# Patient Record
Sex: FEMALE | Race: WHITE | NOT HISPANIC OR LATINO | URBAN - NONMETROPOLITAN AREA
[De-identification: names, ages, dates, MRNs, and addresses within clinical notes are randomized per-mention and may not be internally consistent; named-entity substitution may affect disease eponyms.]

---

## 2017-01-04 RX ORDER — TOPIRAMATE 25 MG/1
TABLET ORAL
Qty: 120 TABLET | Refills: 0 | Status: SHIPPED | OUTPATIENT
Start: 2017-01-04

## 2017-02-07 RX ORDER — TOPIRAMATE 25 MG/1
TABLET ORAL
Qty: 120 TABLET | Refills: 0 | OUTPATIENT
Start: 2017-02-07

## 2017-06-27 DIAGNOSIS — G20 PARKINSON'S DISEASE (HCC): ICD-10-CM

## 2017-06-27 DIAGNOSIS — G43.009 MIGRAINE WITHOUT AURA AND WITHOUT STATUS MIGRAINOSUS, NOT INTRACTABLE: ICD-10-CM

## 2017-06-27 RX ORDER — ROPINIROLE 1 MG/1
TABLET, FILM COATED ORAL
Qty: 90 TABLET | Refills: 0 | Status: SHIPPED | OUTPATIENT
Start: 2017-06-27 | End: 2017-07-26 | Stop reason: SDUPTHER

## 2017-07-26 DIAGNOSIS — G20 PARKINSON'S DISEASE (HCC): ICD-10-CM

## 2017-07-26 RX ORDER — ROPINIROLE 1 MG/1
TABLET, FILM COATED ORAL
Qty: 90 TABLET | Refills: 0 | Status: SHIPPED | OUTPATIENT
Start: 2017-07-26

## 2017-09-04 DIAGNOSIS — G20 PARKINSON'S DISEASE (HCC): ICD-10-CM

## 2017-09-05 RX ORDER — ROPINIROLE 1 MG/1
TABLET, FILM COATED ORAL
Qty: 90 TABLET | Refills: 0 | OUTPATIENT
Start: 2017-09-05

## 2022-09-18 ENCOUNTER — OUTSIDE FACILITY SERVICE (OUTPATIENT)
Dept: CARDIOLOGY | Facility: CLINIC | Age: 62
End: 2022-09-18

## 2022-09-18 PROCEDURE — 93325 DOPPLER ECHO COLOR FLOW MAPG: CPT | Performed by: INTERNAL MEDICINE

## 2022-09-18 PROCEDURE — 93321 DOPPLER ECHO F-UP/LMTD STD: CPT | Performed by: INTERNAL MEDICINE

## 2022-09-18 PROCEDURE — 99222 1ST HOSP IP/OBS MODERATE 55: CPT | Performed by: INTERNAL MEDICINE

## 2022-09-18 PROCEDURE — 93308 TTE F-UP OR LMTD: CPT | Performed by: INTERNAL MEDICINE

## 2022-09-19 ENCOUNTER — OUTSIDE FACILITY SERVICE (OUTPATIENT)
Dept: CARDIOLOGY | Facility: CLINIC | Age: 62
End: 2022-09-19

## 2022-09-19 PROCEDURE — 99232 SBSQ HOSP IP/OBS MODERATE 35: CPT | Performed by: INTERNAL MEDICINE

## 2022-09-19 PROCEDURE — 93458 L HRT ARTERY/VENTRICLE ANGIO: CPT | Performed by: INTERNAL MEDICINE

## 2022-09-19 PROCEDURE — OUTSIDEPOS PR OUTSIDE POS PLACEHOLDER: Performed by: INTERNAL MEDICINE

## 2022-09-20 ENCOUNTER — OUTSIDE FACILITY SERVICE (OUTPATIENT)
Dept: CARDIOLOGY | Facility: CLINIC | Age: 62
End: 2022-09-20

## 2022-09-20 PROCEDURE — 99232 SBSQ HOSP IP/OBS MODERATE 35: CPT | Performed by: INTERNAL MEDICINE

## 2022-11-07 ENCOUNTER — OFFICE VISIT (OUTPATIENT)
Dept: CARDIOLOGY | Facility: CLINIC | Age: 62
End: 2022-11-07

## 2022-11-07 VITALS
SYSTOLIC BLOOD PRESSURE: 184 MMHG | BODY MASS INDEX: 42.65 KG/M2 | WEIGHT: 288 LBS | HEART RATE: 68 BPM | DIASTOLIC BLOOD PRESSURE: 94 MMHG | HEIGHT: 69 IN

## 2022-11-07 DIAGNOSIS — I10 PRIMARY HYPERTENSION: ICD-10-CM

## 2022-11-07 DIAGNOSIS — R60.0 BILATERAL LEG EDEMA: ICD-10-CM

## 2022-11-07 DIAGNOSIS — R00.2 PALPITATIONS: ICD-10-CM

## 2022-11-07 DIAGNOSIS — G25.2 COARSE TREMORS: ICD-10-CM

## 2022-11-07 DIAGNOSIS — E55.9 VITAMIN D DEFICIENCY: ICD-10-CM

## 2022-11-07 DIAGNOSIS — I25.9 IHD (ISCHEMIC HEART DISEASE): Primary | ICD-10-CM

## 2022-11-07 DIAGNOSIS — E66.01 MORBID OBESITY WITH BMI OF 40.0-44.9, ADULT: ICD-10-CM

## 2022-11-07 DIAGNOSIS — R40.0 DAYTIME SOMNOLENCE: ICD-10-CM

## 2022-11-07 DIAGNOSIS — E78.00 HYPERCHOLESTEREMIA: ICD-10-CM

## 2022-11-07 PROCEDURE — 99214 OFFICE O/P EST MOD 30 MIN: CPT | Performed by: INTERNAL MEDICINE

## 2022-11-07 RX ORDER — TRAZODONE HYDROCHLORIDE 150 MG/1
150 TABLET ORAL NIGHTLY
COMMUNITY

## 2022-11-07 RX ORDER — LOSARTAN POTASSIUM 50 MG/1
50 TABLET ORAL DAILY
Qty: 30 TABLET | Refills: 6 | Status: SHIPPED | OUTPATIENT
Start: 2022-11-07 | End: 2022-12-14

## 2022-11-07 RX ORDER — CLOPIDOGREL BISULFATE 75 MG/1
75 TABLET ORAL DAILY
Qty: 30 TABLET | Refills: 6 | Status: SHIPPED | OUTPATIENT
Start: 2022-11-07

## 2022-11-07 RX ORDER — FLUOXETINE HYDROCHLORIDE 60 MG/1
60 TABLET, FILM COATED ORAL; ORAL DAILY
COMMUNITY

## 2022-11-07 RX ORDER — METOPROLOL SUCCINATE 25 MG/1
25 TABLET, EXTENDED RELEASE ORAL DAILY
Qty: 30 TABLET | Refills: 6 | Status: SHIPPED | OUTPATIENT
Start: 2022-11-07

## 2022-11-07 RX ORDER — HYDROCHLOROTHIAZIDE 25 MG/1
25 TABLET ORAL DAILY
Qty: 30 TABLET | Refills: 11 | Status: SHIPPED | OUTPATIENT
Start: 2022-11-07

## 2022-11-07 RX ORDER — ATORVASTATIN CALCIUM 80 MG/1
80 TABLET, FILM COATED ORAL DAILY
Qty: 30 TABLET | Refills: 6 | Status: SHIPPED | OUTPATIENT
Start: 2022-11-07

## 2022-11-07 RX ORDER — ATORVASTATIN CALCIUM 80 MG/1
80 TABLET, FILM COATED ORAL DAILY
COMMUNITY
End: 2022-11-07 | Stop reason: SDUPTHER

## 2022-11-07 RX ORDER — LOSARTAN POTASSIUM 25 MG/1
25 TABLET ORAL DAILY
COMMUNITY
End: 2022-11-07

## 2022-11-07 RX ORDER — ASPIRIN 81 MG/1
81 TABLET ORAL DAILY
COMMUNITY
End: 2022-11-07

## 2022-11-07 RX ORDER — ASPIRIN 81 MG/1
81 TABLET ORAL DAILY
Status: SHIPPED | COMMUNITY
Start: 2022-11-07 | End: 2023-01-11

## 2022-11-07 NOTE — PROGRESS NOTES
Chief Complaint   Patient presents with   • Hospital Follow Up Visit     Cath with stenting in September, reports doing really well since discharge. Pt has lost about 50 pounds over the past few months and quit smoking.    • Medication Problem     1)  Currently on Brilinta, it is costing 162.00 a month, her son is currently paying for it, asking if it could be changed to something cheaper.   2)  pharmacy did not refill the metoprolol tart 25 mg 1/2 tab twice a day, has been out of that for almost a month. BP has been running around 207/100 at times. 170/93 consistently.    • Labs     None since discharge     • Med Refill     Refills needed on cardiac meds. [facility] to [facility]       CARDIAC COMPLAINTS  fatigue and tremors        Subjective   Vandana Lopez is a 62 y.o. female came in today for her follow-up visit.  She has history of hypertension, hypercholesterolemia who used to be a heavy smoker.  She got admitted to the hospital with 2 months ago with increasing chest tightness, shortness of breath associated with nausea.  Her baseline echocardiogram was unremarkable.  She then started having worsening of chest pain.  Stress test was canceled and underwent cardiac catheterization.  Found to have significant disease involving the LAD, circumflex and OM.  Had about 5 stents placed.  She also had significant hyper cholesterolemia with the LDL of 171.  She was started on Lipitor.  She came today stating that she has no more chest pain.  Her shortness of breath is improved.  She has lost about 50 pounds in the last 2 months.  She also completely quit smoking on her own without using any nicotine substitute.  She wants to know whether we can change Brilinta to something cheaper.  She also has not been given prescription for the metoprolol.  She does complain of having some bilateral leg swelling.  She also complaining of feeling little fatigue and tired and also sleep during the daytime.              Cardiac  History  Past Surgical History:   Procedure Laterality Date   • CARDIAC CATHETERIZATION  2022    3.0x18 in OM1. 4.5x12 & 4.5x12 in LCX. 2.25x8 & 3.0x20 in LAD   • ECHO - CONVERTED  2022    @ [facility]. TLS. EF 60%. Mild MR       Current Outpatient Medications   Medication Sig Dispense Refill   • aspirin 81 MG EC tablet Take 1 tablet by mouth Daily. Increase to Bid     • atorvastatin (LIPITOR) 80 MG tablet Take 1 tablet by mouth Daily. 30 tablet 6   • FLUoxetine (PROzac) 60 MG tablet Take 1 tablet by mouth Daily.     • traZODone (DESYREL) 150 MG tablet Take 1 tablet by mouth Every Night.     • clopidogrel (PLAVIX) 75 MG tablet Take 1 tablet by mouth Daily. 30 tablet 6   • FLUoxetine (PROzac) 20 MG capsule Take 20 mg by mouth daily.     • hydroCHLOROthiazide (HYDRODIURIL) 25 MG tablet Take 1 tablet by mouth Daily. 30 tablet 11   • losartan (Cozaar) 50 MG tablet Take 1 tablet by mouth Daily. 30 tablet 6   • metoprolol succinate XL (TOPROL-XL) 25 MG 24 hr tablet Take 1 tablet by mouth Daily. 30 tablet 6   • rOPINIRole (REQUIP) 1 MG tablet TAKE 1 TABLET BY MOUTH 3 TIMES A DAY 90 tablet 0   • topiramate (TOPAMAX) 200 MG tablet TAKE 1 TABLET TWICE A DAY 60 tablet 0   • topiramate (TOPAMAX) 25 MG tablet TAKE 1 TABLET TWICE DAILY FOR 1 WK, THEN INCREASE TO 2 TABLETS TWICE DAILY THEREAFTER 120 tablet 0     No current facility-administered medications for this visit.       Allergies  :  Patient has no known allergies.       Past Medical History:   Diagnosis Date   • Depression    • History of cholecystectomy    • Hyperlipidemia    • Hypertension    • Migraine    • Myocardial infarction (HCC)    • Tremors of nervous system        Social History     Socioeconomic History   • Marital status: Single   Tobacco Use   • Smoking status: Former     Packs/day: 2.00     Years: 23.00     Pack years: 46.00     Types: Cigarettes     Quit date: 2022     Years since quittin.1   • Smokeless tobacco: Never   Vaping Use   •  "Vaping Use: Never used   Substance and Sexual Activity   • Alcohol use: Not Currently     Comment: quit drinking about 15 years ago. ETOH abuse prior to quitting.   • Drug use: Never       Family History   Problem Relation Age of Onset   • Hepatitis Mother    • Hyperlipidemia Father    • Hypertension Father    • Cancer Father    • Heart attack Brother         multiple MI's in his early 50's   • Heart disease Brother    • Heart disease Brother    • Hyperlipidemia Brother    • Hypertension Brother    • Other Maternal Grandmother          in MVA   • Other Maternal Grandfather          in MVA   • Other Paternal Grandmother          at 97 from natural causes   • Other Paternal Grandfather          in MVA at a young age       Review of Systems   Constitutional: Positive for malaise/fatigue. Negative for decreased appetite.   HENT: Negative for congestion and sore throat.    Eyes: Negative for blurred vision, double vision and visual disturbance.   Cardiovascular: Positive for leg swelling. Negative for chest pain and dyspnea on exertion.   Respiratory: Negative for shortness of breath and snoring.    Endocrine: Negative for cold intolerance and heat intolerance.   Hematologic/Lymphatic: Negative for adenopathy. Does not bruise/bleed easily.   Skin: Negative for itching, nail changes and skin cancer.   Musculoskeletal: Negative for arthritis and myalgias.   Gastrointestinal: Negative for abdominal pain, dysphagia and heartburn.   Genitourinary: Negative for bladder incontinence and frequency.   Neurological: Positive for excessive daytime sleepiness. Negative for dizziness, seizures and vertigo.   Psychiatric/Behavioral: Negative for altered mental status.   Allergic/Immunologic: Negative for environmental allergies and hives.       Diabetes- No  Thyroid- normal    Objective     BP (!) 184/94   Pulse 68   Ht 175.3 cm (69\")   Wt 131 kg (288 lb)   BMI 42.53 kg/m²     Vitals and nursing note reviewed. "   Constitutional:       Appearance: Healthy appearance. Not in distress.   Eyes:      Conjunctiva/sclera: Conjunctivae normal.      Pupils: Pupils are equal, round, and reactive to light.   HENT:      Head: Normocephalic.   Pulmonary:      Effort: Pulmonary effort is normal.      Breath sounds: Normal breath sounds.   Cardiovascular:      PMI at left midclavicular line. Normal rate. Regular rhythm.      Murmurs: There is a grade 3/6 high frequency blowing holosystolic murmur at the apex.   Edema:     Ankle: bilateral 1+ edema of the ankle.     Feet: bilateral 1+ edema of the feet.  Abdominal:      General: Bowel sounds are normal.      Palpations: Abdomen is soft.   Musculoskeletal: Normal range of motion.      Cervical back: Normal range of motion and neck supple. Skin:     General: Skin is warm and dry.   Neurological:      Mental Status: Alert, oriented to person, place, and time and oriented to person, place and time.       Procedures            @ASSESSMENT/PLAN@  Class 3 Severe Obesity (BMI >=40). Obesity-related health conditions include the following: obstructive sleep apnea, hypertension, coronary heart disease and dyslipidemias. Obesity is improving with treatment. BMI is is above average; BMI management plan is completed. We discussed low calorie, low carb based diet program, portion control and increasing exercise.     Diagnoses and all orders for this visit:    1. IHD (ischemic heart disease) (Primary)  -     clopidogrel (PLAVIX) 75 MG tablet; Take 1 tablet by mouth Daily.  Dispense: 30 tablet; Refill: 6  -     metoprolol succinate XL (TOPROL-XL) 25 MG 24 hr tablet; Take 1 tablet by mouth Daily.  Dispense: 30 tablet; Refill: 6    2. Primary hypertension  -     losartan (Cozaar) 50 MG tablet; Take 1 tablet by mouth Daily.  Dispense: 30 tablet; Refill: 6  -     metoprolol succinate XL (TOPROL-XL) 25 MG 24 hr tablet; Take 1 tablet by mouth Daily.  Dispense: 30 tablet; Refill: 6  -     Comprehensive  Metabolic Panel; Future  -     hydroCHLOROthiazide (HYDRODIURIL) 25 MG tablet; Take 1 tablet by mouth Daily.  Dispense: 30 tablet; Refill: 11    3. Hypercholesteremia  -     Lipid Panel; Future  -     atorvastatin (LIPITOR) 80 MG tablet; Take 1 tablet by mouth Daily.  Dispense: 30 tablet; Refill: 6    4. Morbid obesity with BMI of 40.0-44.9, adult (HCC)  -     Overnight Sleep Oximetry Study; Future  -     CBC & Differential; Future    5. Coarse tremors    6. Daytime somnolence  -     Overnight Sleep Oximetry Study; Future  -     Vitamin B12 & Folate; Future    7. Vitamin D deficiency  -     Calcitriol (1,25 di-OH Vitamin D); Future    8. Palpitations  -     TSH; Future    9. Bilateral leg edema  -     hydroCHLOROthiazide (HYDRODIURIL) 25 MG tablet; Take 1 tablet by mouth Daily.  Dispense: 30 tablet; Refill: 11       At baseline her heart rate is stable.  Her blood pressure is elevated.  Her clinical examination reveals a BMI of 43.  Her cardiovascular examination is otherwise unremarkable.  She does have trace to 1+ pedal edema.    Regarding her ischemic heart disease, she has undergone multiple stenting.  It is almost 2 months since she had the stents placed.  I advised her to stop Brilinta.  I started her on Plavix 75 mg once a day and increase the aspirin to twice a day.    Regarding her hypertension, it is still significantly elevated.  I increased her dose of losartan to 50 mg once a day and restarted on Toprol-XL at 25 mg.  I also had add hydrochlorothiazide at 25 mg once a day.  She is advised to have the electrolytes checked along with the renal function    Regarding her hypercholesterolemia, she is on Lipitor.  Continue the same and have it rechecked along with the LFT    Regarding her obesity, she has lost weight.  I encouraged her to continue to do so.  I gave her papers on Mediterranean diet.  I also advised her to undergo nocturnal pulse PO2 measurement and also check the CBC.  If it is abnormal she may  need sleep study    Regarding the tremors, it does not look like Parkinson disease.  Hopefully the beta-blockers will help.  If she continues to have the tremors then she may need to see a neurologist    Regarding the daytime sleepiness, will rule out sleep apnea.  I also advised her to check the B12 and folic acid level along with vitamin D also to rule out vitamin D deficiency    Regarding her occasional palpitation, will check the TSH level    Regarding the leg edema, it could be from venous insufficiency but also could be from the hypertension itself.  I did add the hydrochlorothiazide 25 mg which might help    Regarding his smoking history, she has completely quit smoking.  Congratulated her on her decision.    Overall cardiac status appears stable.  I will see her back in 6 months or sooner if needed                  Electronically signed by Jada Cleveland MD November 7, 2022 13:40 EST

## 2022-11-09 LAB
1,25(OH)2D SERPL-MCNC: 38.2 PG/ML (ref 24.8–81.5)
ALBUMIN SERPL-MCNC: 4.1 G/DL (ref 3.8–4.8)
ALBUMIN/GLOB SERPL: 1.5 {RATIO} (ref 1.2–2.2)
ALP SERPL-CCNC: 107 IU/L (ref 44–121)
ALT SERPL-CCNC: 15 IU/L (ref 0–32)
AMBIG ABBREV CMP14 DEFAULT: NORMAL
AST SERPL-CCNC: 14 IU/L (ref 0–40)
BASOPHILS # BLD AUTO: 0 X10E3/UL (ref 0–0.2)
BASOPHILS NFR BLD AUTO: 0 %
BILIRUB SERPL-MCNC: 0.3 MG/DL (ref 0–1.2)
BUN SERPL-MCNC: 17 MG/DL (ref 8–27)
BUN/CREAT SERPL: 15 (ref 12–28)
CALCIUM SERPL-MCNC: 9.4 MG/DL (ref 8.7–10.3)
CHLORIDE SERPL-SCNC: 104 MMOL/L (ref 96–106)
CO2 SERPL-SCNC: 25 MMOL/L (ref 20–29)
CREAT SERPL-MCNC: 1.16 MG/DL (ref 0.57–1)
EGFRCR SERPLBLD CKD-EPI 2021: 54 ML/MIN/1.73
EOSINOPHIL # BLD AUTO: 0.2 X10E3/UL (ref 0–0.4)
EOSINOPHIL NFR BLD AUTO: 4 %
ERYTHROCYTE [DISTWIDTH] IN BLOOD BY AUTOMATED COUNT: 13 % (ref 11.7–15.4)
FOLATE SERPL-MCNC: 5.3 NG/ML
GLOBULIN SER CALC-MCNC: 2.7 G/DL (ref 1.5–4.5)
GLUCOSE SERPL-MCNC: 113 MG/DL (ref 70–99)
HCT VFR BLD AUTO: 42.4 % (ref 34–46.6)
HGB BLD-MCNC: 14.1 G/DL (ref 11.1–15.9)
IMM GRANULOCYTES # BLD AUTO: 0 X10E3/UL (ref 0–0.1)
IMM GRANULOCYTES NFR BLD AUTO: 0 %
LYMPHOCYTES # BLD AUTO: 1.6 X10E3/UL (ref 0.7–3.1)
LYMPHOCYTES NFR BLD AUTO: 25 %
MCH RBC QN AUTO: 31.6 PG (ref 26.6–33)
MCHC RBC AUTO-ENTMCNC: 33.3 G/DL (ref 31.5–35.7)
MCV RBC AUTO: 95 FL (ref 79–97)
MONOCYTES # BLD AUTO: 0.3 X10E3/UL (ref 0.1–0.9)
MONOCYTES NFR BLD AUTO: 5 %
NEUTROPHILS # BLD AUTO: 4.2 X10E3/UL (ref 1.4–7)
NEUTROPHILS NFR BLD AUTO: 66 %
PLATELET # BLD AUTO: 203 X10E3/UL (ref 150–450)
POTASSIUM SERPL-SCNC: 5.1 MMOL/L (ref 3.5–5.2)
PROT SERPL-MCNC: 6.8 G/DL (ref 6–8.5)
RBC # BLD AUTO: 4.46 X10E6/UL (ref 3.77–5.28)
SODIUM SERPL-SCNC: 143 MMOL/L (ref 134–144)
TSH SERPL DL<=0.005 MIU/L-ACNC: 2.73 UIU/ML (ref 0.45–4.5)
VIT B12 SERPL-MCNC: 748 PG/ML (ref 232–1245)
WBC # BLD AUTO: 6.3 X10E3/UL (ref 3.4–10.8)

## 2022-11-11 NOTE — PROGRESS NOTES
Pt made aware of lab results.  Discussed provider recommendations.  Verbalizes understanding.  Copy sent to PCP for review

## 2022-12-14 DIAGNOSIS — I10 PRIMARY HYPERTENSION: ICD-10-CM

## 2022-12-14 RX ORDER — LOSARTAN POTASSIUM 50 MG/1
50 TABLET ORAL DAILY
Qty: 30 TABLET | Refills: 6 | Status: SHIPPED | OUTPATIENT
Start: 2022-12-14

## 2023-01-11 RX ORDER — ASPIRIN 81 MG/1
81 TABLET ORAL 2 TIMES DAILY
Qty: 60 TABLET | Refills: 11 | Status: SHIPPED | OUTPATIENT
Start: 2023-01-11

## 2023-05-03 ENCOUNTER — TELEPHONE (OUTPATIENT)
Dept: CARDIOLOGY | Facility: CLINIC | Age: 63
End: 2023-05-03

## 2023-05-03 NOTE — TELEPHONE ENCOUNTER
Caller: Vandana Lopez    Relationship: Self    Best call back number:  [phone]    Who is your current provider DR. GIMENEZ    Who would you like your new provider to be: DR. HASSAN    What are your reasons for transferring care:PATIENT HAS MOVED AND NEEDS TO KEEP CARDIAC CARE GOING.    Additional notes: PLEASE REACH TO SCHEDULE PATIENT.

## 2023-05-22 ENCOUNTER — OFFICE VISIT (OUTPATIENT)
Dept: CARDIOLOGY | Facility: CLINIC | Age: 63
End: 2023-05-22
Payer: MEDICARE

## 2023-05-22 VITALS
SYSTOLIC BLOOD PRESSURE: 118 MMHG | HEIGHT: 69 IN | WEIGHT: 293 LBS | BODY MASS INDEX: 43.4 KG/M2 | HEART RATE: 56 BPM | DIASTOLIC BLOOD PRESSURE: 76 MMHG

## 2023-05-22 DIAGNOSIS — I25.9 IHD (ISCHEMIC HEART DISEASE): ICD-10-CM

## 2023-05-22 DIAGNOSIS — Z87.891 FORMER SMOKER: ICD-10-CM

## 2023-05-22 DIAGNOSIS — I10 PRIMARY HYPERTENSION: ICD-10-CM

## 2023-05-22 DIAGNOSIS — R60.0 BILATERAL LEG EDEMA: ICD-10-CM

## 2023-05-22 DIAGNOSIS — E66.01 MORBID OBESITY WITH BMI OF 45.0-49.9, ADULT: Primary | ICD-10-CM

## 2023-05-22 DIAGNOSIS — E78.00 HYPERCHOLESTEREMIA: ICD-10-CM

## 2023-05-22 PROCEDURE — 3074F SYST BP LT 130 MM HG: CPT | Performed by: NURSE PRACTITIONER

## 2023-05-22 PROCEDURE — 3078F DIAST BP <80 MM HG: CPT | Performed by: NURSE PRACTITIONER

## 2023-05-22 PROCEDURE — 99214 OFFICE O/P EST MOD 30 MIN: CPT | Performed by: NURSE PRACTITIONER

## 2023-05-22 RX ORDER — ATORVASTATIN CALCIUM 80 MG/1
80 TABLET, FILM COATED ORAL DAILY
Qty: 90 TABLET | Refills: 2 | Status: SHIPPED | OUTPATIENT
Start: 2023-05-22

## 2023-05-22 RX ORDER — METOPROLOL SUCCINATE 25 MG/1
25 TABLET, EXTENDED RELEASE ORAL DAILY
Qty: 90 TABLET | Refills: 2 | Status: SHIPPED | OUTPATIENT
Start: 2023-05-22

## 2023-05-22 RX ORDER — LOSARTAN POTASSIUM 50 MG/1
50 TABLET ORAL DAILY
Qty: 90 TABLET | Refills: 2 | Status: SHIPPED | OUTPATIENT
Start: 2023-05-22

## 2023-05-22 RX ORDER — HYDROCHLOROTHIAZIDE 25 MG/1
25 TABLET ORAL DAILY
Qty: 90 TABLET | Refills: 2 | Status: SHIPPED | OUTPATIENT
Start: 2023-05-22

## 2023-05-22 RX ORDER — CLOPIDOGREL BISULFATE 75 MG/1
75 TABLET ORAL DAILY
Qty: 90 TABLET | Refills: 2 | Status: SHIPPED | OUTPATIENT
Start: 2023-05-22

## 2023-05-22 NOTE — PROGRESS NOTES
Chief Complaint   Patient presents with   • Follow-up     Pt is here for cardiac follow up.  Pt denies CP, SOB, dizziness or palpitations.  Pt has moved and is in the process of transferring care to Dr Baez in [place].  Pt does take ASA daily.   • Med Refill     Pt request 90 day refills to be sent to [facility].  Medications were verified by the pt.     • Lab Work     Pt states their last labs were last week with her PCP.         Cardiac Complaints  none      Subjective   Vandana Lopez is a 62 y.o. female with HTN, hyperlipidemia, tobacco abuse, and CAD diagnosed in September 2022. She was at [facility] with SOA and chest tightness in 2022 and underwent cardiac cath, found to have significant disease involving the LAD, circumflex and OM.  Had about 5 stents placed.  She also had significant hyper cholesterolemia with the LDL of 171.  She was started on Lipitor. At follow up in November, BP was significantly elevated, she was urged to increase losartan and re-add toprol therapy.    She comes today for follow up and denies any new concerns. No CP, SOA, palpitations, dizziness, or syncope noted. Labs with PCP, checked about a week ago. Refills requested for 90 day supply. She has quit smoking and been tobacco free since last time.        Cardiac History  Past Surgical History:   Procedure Laterality Date   • CARDIAC CATHETERIZATION  09/19/2022    3.0x18 in OM1. 4.5x12 & 4.5x12 in LCX. 2.25x8 & 3.0x20 in LAD   • ECHO - CONVERTED  09/18/2022    @ [facility]. TLS. EF 60%. Mild MR       Current Outpatient Medications   Medication Sig Dispense Refill   • aspirin (Aspirin Low Dose) 81 MG EC tablet Take 1 tablet by mouth 2 (Two) Times a Day. 60 tablet 11   • atorvastatin (LIPITOR) 80 MG tablet Take 1 tablet by mouth Daily. 90 tablet 2   • clopidogrel (PLAVIX) 75 MG tablet Take 1 tablet by mouth Daily. 90 tablet 2   • FLUoxetine (PROzac) 60 MG tablet Take 1 tablet by mouth Daily.     • hydroCHLOROthiazide (HYDRODIURIL) 25 MG  tablet Take 1 tablet by mouth Daily. 90 tablet 2   • losartan (COZAAR) 50 MG tablet Take 1 tablet by mouth Daily. 90 tablet 2   • metoprolol succinate XL (TOPROL-XL) 25 MG 24 hr tablet Take 1 tablet by mouth Daily. 90 tablet 2   • rOPINIRole (REQUIP) 1 MG tablet TAKE 1 TABLET BY MOUTH 3 TIMES A DAY 90 tablet 0   • topiramate (TOPAMAX) 200 MG tablet TAKE 1 TABLET TWICE A DAY 60 tablet 0   • traZODone (DESYREL) 150 MG tablet Take 1 tablet by mouth Every Night.       No current facility-administered medications for this visit.       Patient has no known allergies.    Past Medical History:   Diagnosis Date   • Congenital heart disease    • Coronary artery disease    • Depression    • Heart valve disease    • History of cholecystectomy    • Hyperlipidemia    • Hypertension    • Migraine    • Myocardial infarction    • Tremors of nervous system        Social History     Socioeconomic History   • Marital status: Single   Tobacco Use   • Smoking status: Former     Packs/day: 1.00     Years: 15.00     Pack years: 15.00     Types: Cigarettes     Quit date: 2022     Years since quittin.6   • Smokeless tobacco: Never   Vaping Use   • Vaping Use: Never used   Substance and Sexual Activity   • Alcohol use: Not Currently     Comment: quit drinking about 15 years ago. ETOH abuse prior to quitting.   • Drug use: No   • Sexual activity: Never       Family History   Problem Relation Age of Onset   • Hepatitis Mother    • Hyperlipidemia Father    • Hypertension Father    • Cancer Father    • Heart attack Brother         multiple MI's in his early 50's   • Heart disease Brother    • Heart disease Brother    • Hyperlipidemia Brother    • Hypertension Brother    • Other Maternal Grandmother          in MVA   • Other Maternal Grandfather          in MVA   • Other Paternal Grandmother          at 97 from natural causes   • Other Paternal Grandfather          in MVA at a young age   • Heart attack Brother    • Heart  "attack Brother    • Heart attack Brother    • Heart attack Brother    • Heart disease Brother    • Heart failure Brother        Review of Systems   Constitutional: Negative for malaise/fatigue and night sweats.   Cardiovascular: Negative for chest pain, claudication, dyspnea on exertion, irregular heartbeat, leg swelling, near-syncope, orthopnea, palpitations and syncope.   Respiratory: Negative for cough, shortness of breath and wheezing.    Musculoskeletal: Positive for stiffness. Negative for back pain and joint pain.   Gastrointestinal: Negative for anorexia, heartburn, nausea and vomiting.   Genitourinary: Negative for dysuria, hematuria, hesitancy and nocturia.   Neurological: Negative for dizziness, headaches and light-headedness.   Psychiatric/Behavioral: Negative for depression and memory loss. The patient is not nervous/anxious.            Objective     /76 (BP Location: Left arm, Patient Position: Sitting)   Pulse 56   Ht 175.3 cm (69\")   Wt (!) 138 kg (304 lb 12.8 oz)   BMI 45.01 kg/m²     Constitutional:       Appearance: Not in distress.   Eyes:      Pupils: Pupils are equal, round, and reactive to light.   HENT:      Nose: Nose normal.   Pulmonary:      Effort: Pulmonary effort is normal.      Breath sounds: Normal breath sounds.   Cardiovascular:      PMI at left midclavicular line. Bradycardia present. Regular rhythm.      Murmurs: There is a systolic murmur.   Abdominal:      Palpations: Abdomen is soft.   Musculoskeletal: Normal range of motion.      Cervical back: Normal range of motion and neck supple. Skin:     General: Skin is warm and dry.   Neurological:      Mental Status: Alert.         Procedures         Diagnoses and all orders for this visit:    1. Morbid obesity with BMI of 45.0-49.9, adult (Primary)    2. IHD (ischemic heart disease)  -     clopidogrel (PLAVIX) 75 MG tablet; Take 1 tablet by mouth Daily.  Dispense: 90 tablet; Refill: 2  -     metoprolol succinate XL " (TOPROL-XL) 25 MG 24 hr tablet; Take 1 tablet by mouth Daily.  Dispense: 90 tablet; Refill: 2    3. Hypercholesteremia  -     atorvastatin (LIPITOR) 80 MG tablet; Take 1 tablet by mouth Daily.  Dispense: 90 tablet; Refill: 2    4. Primary hypertension  -     hydroCHLOROthiazide (HYDRODIURIL) 25 MG tablet; Take 1 tablet by mouth Daily.  Dispense: 90 tablet; Refill: 2  -     losartan (COZAAR) 50 MG tablet; Take 1 tablet by mouth Daily.  Dispense: 90 tablet; Refill: 2  -     metoprolol succinate XL (TOPROL-XL) 25 MG 24 hr tablet; Take 1 tablet by mouth Daily.  Dispense: 90 tablet; Refill: 2    5. Bilateral leg edema  -     hydroCHLOROthiazide (HYDRODIURIL) 25 MG tablet; Take 1 tablet by mouth Daily.  Dispense: 90 tablet; Refill: 2    6. Former smoker             IHD: Status stable. No new concerns voiced. Continue DAPT, no new workup advised.    HTN: BP well managed since changes last visit. Continue higher losartan therapy as well as addition of BB therapy, HCTZ at same. Limited sodium urged.    Hyperlipidemia: On lipitor therapy, tolerates well. Will continue same. Limited carb diet urged.    Edema: Managed with HCTZ, same advised. Limited sodium diet urged.    BMI noted at 45.01, good cardiac diet encouraged as well as walking as tolerated.    Refills per request.    [facility] to [place]. Will call with complaints or further appointment if needed.    6 month follow up recommended, or sooner if needed.    Remains tobacco free, praised for her efforts.        Problems Addressed this Visit        Cardiac and Vasculature    Hypercholesteremia    Relevant Medications    atorvastatin (LIPITOR) 80 MG tablet    Primary hypertension    Relevant Medications    hydroCHLOROthiazide (HYDRODIURIL) 25 MG tablet    losartan (COZAAR) 50 MG tablet    metoprolol succinate XL (TOPROL-XL) 25 MG 24 hr tablet    IHD (ischemic heart disease)    Relevant Medications    clopidogrel (PLAVIX) 75 MG tablet     metoprolol succinate XL (TOPROL-XL) 25 MG 24 hr tablet       Symptoms and Signs    Bilateral leg edema    Relevant Medications    hydroCHLOROthiazide (HYDRODIURIL) 25 MG tablet   Other Visit Diagnoses     Morbid obesity with BMI of 45.0-49.9, adult    -  Primary    Former smoker          Diagnoses       Codes Comments    Morbid obesity with BMI of 45.0-49.9, adult    -  Primary ICD-10-CM: E66.01, Z68.42  ICD-9-CM: 278.01, V85.42     IHD (ischemic heart disease)     ICD-10-CM: I25.9  ICD-9-CM: 414.9     Hypercholesteremia     ICD-10-CM: E78.00  ICD-9-CM: 272.0     Primary hypertension     ICD-10-CM: I10  ICD-9-CM: 401.9     Bilateral leg edema     ICD-10-CM: R60.0  ICD-9-CM: 782.3     Former smoker     ICD-10-CM: Z87.891  ICD-9-CM: V15.82                     Electronically signed by Brandy Flowers, ANTONINO May 22, 2023 11:20 EDT

## 2024-04-19 ENCOUNTER — TELEPHONE (OUTPATIENT)
Dept: CARDIOLOGY | Facility: CLINIC | Age: 64
End: 2024-04-19
Payer: MEDICARE

## 2024-04-19 NOTE — TELEPHONE ENCOUNTER
Spoke with [facility] and they will notify patient of apt and they will also make her aware to call and check for cancellations if she would like in earlier.

## 2024-04-19 NOTE — TELEPHONE ENCOUNTER
Caller: YUMI SOTO    Relationship to patient:     Best call back number: [phone]    Chief complaint:     Type of visit: FOLLOW UP    Requested date: ASAP     If rescheduling, when is the original appointment: NONE     Additional notes: PCP ADVISING PATIENT IS HAVING CHEST PAIN (NOT CURRENT) HUB HAS NO SCHEDULING TIME FRAME, PATIENT WAS LAST SEEN 11.2022. PLEASE CALL THE ABOVE TO SCHEDULE.

## 2024-06-04 DIAGNOSIS — E78.00 HYPERCHOLESTEREMIA: ICD-10-CM

## 2024-06-04 DIAGNOSIS — I10 PRIMARY HYPERTENSION: ICD-10-CM

## 2024-06-05 RX ORDER — ATORVASTATIN CALCIUM 80 MG/1
80 TABLET, FILM COATED ORAL DAILY
Qty: 30 TABLET | Refills: 0 | Status: SHIPPED | OUTPATIENT
Start: 2024-06-05

## 2024-06-05 RX ORDER — LOSARTAN POTASSIUM 50 MG/1
50 TABLET ORAL DAILY
Qty: 30 TABLET | Refills: 0 | Status: SHIPPED | OUTPATIENT
Start: 2024-06-05